# Patient Record
Sex: MALE | Race: WHITE
[De-identification: names, ages, dates, MRNs, and addresses within clinical notes are randomized per-mention and may not be internally consistent; named-entity substitution may affect disease eponyms.]

---

## 2018-09-17 ENCOUNTER — HOSPITAL ENCOUNTER (EMERGENCY)
Dept: HOSPITAL 62 - ER | Age: 58
LOS: 1 days | Discharge: HOME | End: 2018-09-18
Payer: MEDICARE

## 2018-09-17 VITALS — DIASTOLIC BLOOD PRESSURE: 83 MMHG | SYSTOLIC BLOOD PRESSURE: 143 MMHG

## 2018-09-17 DIAGNOSIS — R51: Primary | ICD-10-CM

## 2018-09-17 DIAGNOSIS — F11.23: ICD-10-CM

## 2018-09-17 LAB
ADD MANUAL DIFF: NO
ALBUMIN SERPL-MCNC: 4.8 G/DL (ref 3.5–5)
ALP SERPL-CCNC: 80 U/L (ref 38–126)
ALT SERPL-CCNC: 12 U/L (ref 21–72)
ANION GAP SERPL CALC-SCNC: 11 MMOL/L (ref 5–19)
APTT BLD: 31.4 SEC (ref 23.5–35.8)
AST SERPL-CCNC: 28 U/L (ref 17–59)
BASOPHILS # BLD AUTO: 0.1 10^3/UL (ref 0–0.2)
BASOPHILS NFR BLD AUTO: 0.6 % (ref 0–2)
BILIRUB DIRECT SERPL-MCNC: 0.6 MG/DL (ref 0–0.4)
BILIRUB SERPL-MCNC: 1.3 MG/DL (ref 0.2–1.3)
BUN SERPL-MCNC: 18 MG/DL (ref 7–20)
CALCIUM: 9.8 MG/DL (ref 8.4–10.2)
CHLORIDE SERPL-SCNC: 99 MMOL/L (ref 98–107)
CK MB SERPL-MCNC: < 0.22 NG/ML (ref ?–4.55)
CK SERPL-CCNC: 28 U/L (ref 55–170)
CO2 SERPL-SCNC: 27 MMOL/L (ref 22–30)
EOSINOPHIL # BLD AUTO: 0.1 10^3/UL (ref 0–0.6)
EOSINOPHIL NFR BLD AUTO: 1 % (ref 0–6)
ERYTHROCYTE [DISTWIDTH] IN BLOOD BY AUTOMATED COUNT: 14.1 % (ref 11.5–14)
GLUCOSE SERPL-MCNC: 99 MG/DL (ref 75–110)
HCT VFR BLD CALC: 48.5 % (ref 37.9–51)
HGB BLD-MCNC: 16.2 G/DL (ref 13.5–17)
INR PPP: 0.98
LYMPHOCYTES # BLD AUTO: 2.7 10^3/UL (ref 0.5–4.7)
LYMPHOCYTES NFR BLD AUTO: 22.2 % (ref 13–45)
MCH RBC QN AUTO: 29.8 PG (ref 27–33.4)
MCHC RBC AUTO-ENTMCNC: 33.3 G/DL (ref 32–36)
MCV RBC AUTO: 89 FL (ref 80–97)
MONOCYTES # BLD AUTO: 1 10^3/UL (ref 0.1–1.4)
MONOCYTES NFR BLD AUTO: 8.6 % (ref 3–13)
NEUTROPHILS # BLD AUTO: 8.1 10^3/UL (ref 1.7–8.2)
NEUTS SEG NFR BLD AUTO: 67.6 % (ref 42–78)
PLATELET # BLD: 358 10^3/UL (ref 150–450)
POTASSIUM SERPL-SCNC: 4.8 MMOL/L (ref 3.6–5)
PROT SERPL-MCNC: 9.1 G/DL (ref 6.3–8.2)
PROTHROMBIN TIME: 13.5 SEC (ref 11.4–15.4)
RBC # BLD AUTO: 5.43 10^6/UL (ref 4.35–5.55)
SODIUM SERPL-SCNC: 137.1 MMOL/L (ref 137–145)
TOTAL CELLS COUNTED % (AUTO): 100 %
TROPONIN I SERPL-MCNC: < 0.012 NG/ML
WBC # BLD AUTO: 12 10^3/UL (ref 4–10.5)

## 2018-09-17 PROCEDURE — 96361 HYDRATE IV INFUSION ADD-ON: CPT

## 2018-09-17 PROCEDURE — 96375 TX/PRO/DX INJ NEW DRUG ADDON: CPT

## 2018-09-17 PROCEDURE — 85025 COMPLETE CBC W/AUTO DIFF WBC: CPT

## 2018-09-17 PROCEDURE — 99284 EMERGENCY DEPT VISIT MOD MDM: CPT

## 2018-09-17 PROCEDURE — 84484 ASSAY OF TROPONIN QUANT: CPT

## 2018-09-17 PROCEDURE — 85730 THROMBOPLASTIN TIME PARTIAL: CPT

## 2018-09-17 PROCEDURE — 71045 X-RAY EXAM CHEST 1 VIEW: CPT

## 2018-09-17 PROCEDURE — 96374 THER/PROPH/DIAG INJ IV PUSH: CPT

## 2018-09-17 PROCEDURE — 82550 ASSAY OF CK (CPK): CPT

## 2018-09-17 PROCEDURE — 80053 COMPREHEN METABOLIC PANEL: CPT

## 2018-09-17 PROCEDURE — 70450 CT HEAD/BRAIN W/O DYE: CPT

## 2018-09-17 PROCEDURE — 82553 CREATINE MB FRACTION: CPT

## 2018-09-17 PROCEDURE — 85610 PROTHROMBIN TIME: CPT

## 2018-09-17 PROCEDURE — 36415 COLL VENOUS BLD VENIPUNCTURE: CPT

## 2018-09-17 NOTE — ER DOCUMENT REPORT
ED Headache





- General


Chief Complaint: Headache


Stated Complaint: HEADACHE


Time Seen by Provider: 09/17/18 17:21


Mode of Arrival: Wheelchair


Information source: Patient


TRAVEL OUTSIDE OF THE [place] IN LAST 30 DAYS: No





- HPI


Patient complains to provider of: Headache


Notes: 








This is a 58-year-old male with chronic neck and back pain who presents 

complaining of sinus headache that started yesterday that worsened today.  Pain 

is located deep to the right eye and is throbbing in nature.  He states he does 

have some blurred vision bilaterally but nothing seems significantly changed 

and there is no vision loss.  He has had similar when he has had sinus 

infections in the past.  He has had rhinorrhea as well.  Patient states that he 

also has been detoxing from narcotics and has not used any narcotics for the 

past 3 days he did not like the way the medications made him feel and would 

rather deal with the pain.  Patient denies any nausea or vomiting today but did 

report vomiting earlier in the week.  He reports he has had poor intake the 

last few days and not drinking very well.  He also states generalized weakness 

as well.  Denies any fever.  No nuchal rigidity.  No focal weakness numbness or 

tingling.  He believes he is developing some diarrhea.





- Related Data


Allergies/Adverse Reactions: 


 





No Known Allergies Allergy (Unverified 09/17/18 15:26)


 











Past Medical History





- General


Information source: Patient





- Social History


Smoking Status: Current Some Day Smoker


Chew tobacco use (# tins/day): No


Frequency of alcohol use: None


Drug Abuse: None


Patient has suicidal ideation: No


Patient has homicidal ideation: No


Renal/ Medical History: Denies: Hx Peritoneal Dialysis





Review of Systems





- Review of Systems


-: Yes All other systems reviewed and negative





Physical Exam





- Vital signs


Vitals: 





 











Temp Pulse Resp BP Pulse Ox


 


 97.9 F   75   18   130/87 H  98 


 


 09/17/18 15:58  09/17/18 15:58  09/17/18 15:58  09/17/18 15:58  09/17/18 15:58














- Notes


Notes: 





GENERAL: VS as per nursing doc. Well-appearing, well-nourished and in no acute 

distress.





HEAD: Atraumatic, normocephalic.





EYES: Pupils equal round and reactive to light, extraocular movements intact, 

sclera anicteric, no conjunctival injection or discharge.





ENT: Nares patent, oropharynx clear without exudates.  Moist mucous membranes.  

There is some right frontal tenderness to palpation.





NECK: Normal range of motion, supple, no carotid bruits.





LUNGS: Breath sounds clear to auscultation though somewhat coarse and decreased 

overall.  No wheezes rales or rhonchi.





HEART: Normal S1S2. Regular rate and rhythm without murmurs. Equal peripheral 

pulses.





ABDOMEN: Soft, non-tender.





EXTREMITIES: No significant range of motion limitations.  No edema.





NEUROLOGICAL: GCS 15, Cranial nerves II-XII intact. Normal visual fields.  

Normal speech without aphasia. No pronator drift. 5/5 RUE strength, 5/5 RLE 

strength, 5/5 LUE strength, 5/5 LLE strength. No cerebellar abnormalities 

including normal finger-nose testing. Negative Babinski, 2+= DTR refelexes.





PSYCH: Normal mood, normal affect.





SKIN: Warm, Dry, no cyanosis, Cap refill < 2 sec.











Course





- Vital Signs


Vital signs: 





 











Temp Pulse Resp BP Pulse Ox


 


 97.9 F   75   18   130/87 H  98 


 


 09/17/18 15:58  09/17/18 15:58  09/17/18 15:58  09/17/18 15:58  09/17/18 15:58














- Laboratory


Result Diagrams: 


 09/17/18 19:34





 09/17/18 19:34


Laboratory results interpreted by me: 





 











  09/17/18 09/17/18





  19:34 19:34


 


WBC  12.0 H 


 


RDW  14.1 H 


 


Direct Bilirubin   0.6 H


 


ALT   12 L


 


Creatine Kinase   28 L


 


Total Protein   9.1 H

## 2018-09-17 NOTE — ER DOCUMENT REPORT
ED Medical Screen (RME)





- General


Chief Complaint: Headache


Stated Complaint: HEADACHE


Time Seen by Provider: 09/17/18 17:21


Mode of Arrival: Wheelchair


Information source: Patient


Notes: 





Patient presents complaining of sinus headache that started yesterday that 

worsened today.  Patient states that he also has been detoxing from narcotics 

and has not used any narcotics for the past 3 days.  Patient denies any nausea 

or vomiting today but did report vomiting earlier in the week.  Patient states 

he has not had any food intake for the past 7 days.  Patient states that his 

gait has been off but he has had gait instability in the past when he has had 

sinus infections.  Patient is concerned that maybe his gait instability 

symptoms may also be due to poor intake in addition to recent narcotics 

detoxing.





I have greeted and performed a rapid initial assessment of this patient.  A 

comprehensive ED assessment and evaluation of the patient, analysis of test 

results and completion of the medical decision making process will be conducted 

by additional ED providers.


TRAVEL OUTSIDE OF THE [place] IN LAST 30 DAYS: No





- Related Data


Allergies/Adverse Reactions: 


 





No Known Allergies Allergy (Unverified 09/17/18 15:26)


 











Past Medical History





- Social History


Chew tobacco use (# tins/day): No


Frequency of alcohol use: None


Drug Abuse: None


Renal/ Medical History: Denies: Hx Peritoneal Dialysis





Physical Exam





- Vital signs


Vitals: 





 











Temp Pulse Resp BP Pulse Ox


 


 97.9 F   75   18   130/87 H  98 


 


 09/17/18 15:58  09/17/18 15:58  09/17/18 15:58  09/17/18 15:58  09/17/18 15:58














- Neurological


Guillermina Coma Scale Eye Opening: Spontaneous


Guillermina Coma Scale Verbal: Oriented


Guillermina Coma Scale Motor: Obeys Commands


Guillermina Coma Scale Total: 15


Cerebellar coordination: Gait ataxia





Course





- Re-evaluation


Re-evalutation: 





09/17/18 17:30


Patient sent to CT from triage.





- Vital Signs


Vital signs: 





 











Temp Pulse Resp BP Pulse Ox


 


 97.9 F   75   18   130/87 H  98 


 


 09/17/18 15:58  09/17/18 15:58  09/17/18 15:58  09/17/18 15:58  09/17/18 15:58

## 2018-09-17 NOTE — RADIOLOGY REPORT (SQ)
EXAM DESCRIPTION:  CT HEAD WITHOUT



COMPLETED DATE/TIME:  9/17/2018 5:35 pm



REASON FOR STUDY:  HA, gait instability



COMPARISON:  None.



TECHNIQUE:  Axial images acquired through the brain without intravenous contrast.  Images reviewed wi
th bone, brain and subdural windows.  Additional sagittal and coronal reconstructions were generated.
 Images stored on PACS.

All CT scanners at this facility use dose modulation, iterative reconstruction, and/or weight based d
osing when appropriate to reduce radiation dose to as low as reasonably achievable (ALARA).

[facility]: Dose Right  [facility]: CareDose    [facility]: Dose Right    [facility]: Teradose 4D    [facility]: Smart SocialProof



RADIATION DOSE:  CT Rad equipment meets quality standard of care and radiation dose reduction techniq
ues were employed. CTDIvol: 53.2 mGy. DLP: 1017 mGy-cm. mGy.



LIMITATIONS:  None.



FINDINGS:  VENTRICLES: Normal size and contour.

CEREBRUM: No masses.  No hemorrhage.  No midline shift.  No evidence for acute infarction. Normal gra
y/white matter differentiation. No areas of low density in the white matter.

CEREBELLUM: No masses.  No hemorrhage.  No alteration of density.  No evidence for acute infarction.

EXTRAAXIAL SPACES: No fluid collections.  No masses.

ORBITS AND GLOBE: No intra- or extraconal masses.  Normal contour of globe without masses.

CALVARIUM: No fracture.

PARANASAL SINUSES: Extensive mucous membrane thickening in the frontal, ethmoid, and sphenoid sinuses
.

SOFT TISSUES: No mass or hematoma.

OTHER: No other significant finding.



IMPRESSION:  NORMAL BRAIN CT WITHOUT CONTRAST.

EXTENSIVE SINUS DISEASE.

EVIDENCE OF ACUTE STROKE: NO.



COMMENT:  Quality ID # 436: Final reports with documentation of one or more dose reduction techniques
 (e.g., Automated exposure control, adjustment of the mA and/or kV according to patient size, use of 
iterative reconstruction technique)



TECHNICAL DOCUMENTATION:  JOB ID:  [id number]

 2011 [employer]- All Rights Reserved



Reading location - IP/workstation name: [email or web address]

## 2018-09-17 NOTE — ER DOCUMENT REPORT
ED Headache





- General


Chief Complaint: Headache


Stated Complaint: HEADACHE


Time Seen by Provider: 09/17/18 17:21


Mode of Arrival: Ambulatory


Information source: Patient


TRAVEL OUTSIDE OF THE [place] IN LAST 30 DAYS: No





- HPI


Patient complains to provider of: Headache


Notes: 





This is a 58-year-old male complaining of sinus headache that started yesterday 

that worsened today.  It is located behind his right eye throbbing in nature 

without radiation.  He does have a history of chronic head and neck pain and 

has had similar to this in the past.  Patient states that he also has been 

detoxing from narcotics and has not used any narcotics for the past 3 days.  He 

does report that he has had poor p.o. intake for the last week particularly 

last 3 days has not had any vomiting today though.  He does feel like he is 

getting a little bit of diarrhea.  Denies any focal weakness numbness tingling 

or other neurologic change.  He does seem to feel he is a little unstable but 

has had that with sinus issues in the past and also thinks this might be from 

the poor p.o. intake.  Denies any cough or cold symptoms other than the 

rhinorrhea.  There is no associated fever.  Denies trauma.  Denies 

anticoagulation as well.





- Related Data


Allergies/Adverse Reactions: 


 





No Known Allergies Allergy (Unverified 09/17/18 15:26)


 











Past Medical History





- General


Information source: Patient





- Social History


Smoking Status: Current Some Day Smoker


Chew tobacco use (# tins/day): No


Frequency of alcohol use: None


Drug Abuse: None


Family History: Reviewed & Not Pertinent


Patient has suicidal ideation: No


Patient has homicidal ideation: No


Renal/ Medical History: Denies: Hx Peritoneal Dialysis





Review of Systems





- Review of Systems


-: Yes All other systems reviewed and negative





Physical Exam





- Vital signs


Vitals: 


 











Temp Pulse Resp BP Pulse Ox


 


 97.9 F   75   18   130/87 H  98 


 


 09/17/18 15:58  09/17/18 15:58  09/17/18 15:58  09/17/18 15:58  09/17/18 15:58











Notes: 





Reviewed





- Notes


Notes: 





GENERAL: VS as per nursing doc. Well-appearing, well-nourished and in no acute 

distress.





HEAD: Atraumatic, normocephalic.





EYES: Pupils equal round and reactive to light, extraocular movements intact, 

sclera anicteric, no conjunctival injection or discharge.





ENT: Nares patent, oropharynx clear without exudates.  Moist mucous membranes.  

Mild right frontal sinus tenderness to palpation.





NECK: Normal range of motion, supple, no carotid bruits.





LUNGS: Breath sounds clear to auscultation bilaterally and equal.  No wheezes 

rales or rhonchi.





HEART: Normal S1S2. Regular rate and rhythm without murmurs. Equal peripheral 

pulses.





ABDOMEN: Soft, non-tender.





EXTREMITIES: No significant range of motion limitations.  No edema.





NEUROLOGICAL: GCS 15, Cranial nerves II-XII intact. Normal visual fields.  

Normal speech without aphasia. No pronator drift. 5/5 RUE strength, 5/5 RLE 

strength, 5/5 LUE strength, 5/5 LLE strength. No cerebellar abnormalities 

including normal finger-nose testing. Negative Babinski, 2+= DTR refelexes.





PSYCH: Normal mood, normal affect.





SKIN: Warm, Dry, no cyanosis, Cap refill < 2 sec.











Course





- Re-evaluation


Re-evalutation: 





09/17/18 22:59


CT scan failed to show any significant intracranial pathology.  His neurologic 

exam is intact.  He does have frontal sinus tenderness and appears to be 

developing some narcotic withdrawal symptoms.  Discussed with him follow-up.  

He will be rehydrated given medication here.  Feels like his head is improved 

actually slightly already after receiving some Tylenol here.


09/17/18 23:39


Patient rechecked and has received fluids and medication.  He states he is 

feeling much better and would like to go home.  With concern for sinusitis 

causing significant symptoms I will go on and place him on antibiotics.  We 

will try to treat him symptomatically otherwise.





- Vital Signs


Vital signs: 


 











Temp Pulse Resp BP Pulse Ox


 


 98.3 F   58 L  16   143/83 H  96 


 


 09/17/18 23:55  09/17/18 23:55  09/17/18 23:55  09/17/18 23:55  09/17/18 23:55














- Laboratory


Result Diagrams: 


 09/17/18 19:34





 09/17/18 19:34


Laboratory results interpreted by me: 


 











  09/17/18 09/17/18





  19:34 19:34


 


WBC  12.0 H 


 


RDW  14.1 H 


 


Direct Bilirubin   0.6 H


 


ALT   12 L


 


Creatine Kinase   28 L


 


Total Protein   9.1 H














Discharge





- Discharge


Clinical Impression: 


 Headache, Drug withdrawal syndrome





Condition: Good


Disposition: HOME, SELF-CARE


Instructions:  Antinausea Medication ([facility]), Headache ([facility])


Additional Instructions: 


Follow-up with your primary care physician.  Return for worsening or concern.


Prescriptions: 


Amox Tr/Potassium Clavulanate [Augmentin 875-125 Tablet] 1 tab PO BID 10 Days #

20 tablet


Ondansetron [Zofran Odt 4 mg Tablet] 1 - 2 tab PO Q4H PRN #15 tab.rapdis


 PRN Reason: For Nausea/Vomiting